# Patient Record
Sex: FEMALE | Employment: STUDENT
[De-identification: names, ages, dates, MRNs, and addresses within clinical notes are randomized per-mention and may not be internally consistent; named-entity substitution may affect disease eponyms.]

---
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
## 2024-05-08 DIAGNOSIS — N91.2 AMENORRHEA: ICD-10-CM

## 2024-05-08 DIAGNOSIS — Q89.8 CHARGE SYNDROME (CMD): ICD-10-CM

## 2024-05-08 DIAGNOSIS — N91.0 AMENORRHEA, PRIMARY: ICD-10-CM

## 2024-05-08 RX ORDER — MEDROXYPROGESTERONE ACETATE 10 MG/1
TABLET ORAL
Qty: 30 TABLET | Refills: 1 | OUTPATIENT
Start: 2024-05-08

## (undated) NOTE — LETTER
[facility] [address], [address] [place] [address]    Consent for Anesthesia   1.    Mindy ZHU agree to be cared for by a physician anesthesiologist alone and/or with a nurse anesthetist, who is specially trained to monitor me and give procedure, allergic reactions to medications, injury to my airway, heart, lungs, vision, nerves, or muscles and in extremely rare instances death. 5. My doctor has explained to me other choices available to me for my care (alternatives).   6. Pregnant Raisa Printed: 1/18/2022 at 7:45 AM    Medical Record #: [id number]                                            Page 1 of 1

## (undated) NOTE — LETTER
I authorize the performance upon Eliseo Cai the following: Magnetic Resonance Imaging of the soft tissue of the pelvis with and without contrast with sedation per anesthesia   1.  I authorize Dr. Kraig Benavides (and whomever is designated as the doctor’s a [id number]

## (undated) NOTE — LETTER
Pre-Procedure  Veola Congress    Dr. Davida Larios Surgery not found *        I acknowledge that I have been informed of the risk involved by refusing the Pregnancy Test on Eliseo Cai.     I, thereby, r